# Patient Record
Sex: MALE | Race: WHITE | URBAN - METROPOLITAN AREA
[De-identification: names, ages, dates, MRNs, and addresses within clinical notes are randomized per-mention and may not be internally consistent; named-entity substitution may affect disease eponyms.]

---

## 2019-01-08 ENCOUNTER — OFFICE VISIT (OUTPATIENT)
Dept: DERMATOLOGY | Facility: CLINIC | Age: 19
End: 2019-01-08
Payer: COMMERCIAL

## 2019-01-08 VITALS — HEART RATE: 83 BPM | DIASTOLIC BLOOD PRESSURE: 78 MMHG | SYSTOLIC BLOOD PRESSURE: 119 MMHG

## 2019-01-08 DIAGNOSIS — L65.9 LOSS OF HAIR: Primary | ICD-10-CM

## 2019-01-08 DIAGNOSIS — L21.9 DERMATITIS, SEBORRHEIC: ICD-10-CM

## 2019-01-08 RX ORDER — CLOBETASOL PROPIONATE 0.05 G/100ML
SHAMPOO TOPICAL
Qty: 118 ML | Refills: 1 | Status: SHIPPED | OUTPATIENT
Start: 2019-01-08

## 2019-01-08 RX ORDER — KETOCONAZOLE 20 MG/ML
SHAMPOO TOPICAL
Refills: 0 | COMMUNITY
Start: 2018-10-29

## 2019-01-08 RX ORDER — KETOCONAZOLE 20 MG/ML
SHAMPOO TOPICAL
Qty: 120 ML | Refills: 3 | Status: SHIPPED | OUTPATIENT
Start: 2019-01-08

## 2019-01-08 RX ORDER — FLUOCINOLONE ACETONIDE 0.11 MG/ML
OIL TOPICAL
Qty: 118 ML | Refills: 3 | Status: CANCELLED | OUTPATIENT
Start: 2019-01-08

## 2019-01-08 RX ORDER — FINASTERIDE 1 MG/1
1 TABLET, FILM COATED ORAL DAILY
Refills: 0 | COMMUNITY
Start: 2018-11-29

## 2019-01-08 ASSESSMENT — PAIN SCALES - GENERAL: PAINLEVEL: NO PAIN (0)

## 2019-01-08 NOTE — LETTER
"1/8/2019       RE: Kris Parks  [address]  [facility] [address]     Dear Colleague,    Thank you for referring your patient, Kris Parks, to the [facility] DERMATOLOGY at [facility]. Please see a copy of my visit note below.    [facility] Dermatology Note      Dermatology Problem List:  1. Non-scarring hair loss -  Monitor the trajectory of hair growth     Lab orders sent to outside facility     Continue Rogaine 5% foam daily     Continue finasteride 2.5mg daily     Monitor to figure out what is needed for maintenance when patient returns from [place] in the summer     2. Seborrheic dermatitis - moderate     Start Clobex shampoo 2-3 times weekly    Start 2% ketoconazole shampoo 2-3 times weekly    Encounter Date: Jan 8, 2019    CC:  Chief Complaint   Patient presents with     Hair Loss     Kris is here today for hair loss. Kris notes\" The hair loss is more so thinning rather than patches\"        History of Present Illness:  Mr. Kirs Parks is a 18 year old male who presents, with his father an internist, as a referral from Dr. Brannon Souza of [facility] for hair loss. He feels that his hair started shedding in the summer 2017 a few months after experiencing a stressful event in the winter. He feels that the shedding is generalized and is most noticeable after showering/shampooing. He experiences very mild itching but no burning or pain. He has also noticed a decrease in eyebrow and eyelash density but denies changes in body hair, nails. Per the father, he has not had any thyroid issues. The patient also feels that his hair has improved since the shedding first started.     Currently he shampoos with head and shoulders or 2% ketoconazole every other day, takes finasteride 2 mg daily (will go to up 2.5mg when finished with his current bottle), multivitamin for men, and Rogaine 5% foam daily.     The patient notes that he started " doxycycline for acne in the spring 2017 and stopped 3 months ago.     Past Medical History:   There is no problem list on file for this patient.  acne - one course of Accutane 2013, now on doxycycline as noted above    History reviewed. No pertinent past medical history.  History reviewed. No pertinent surgical history.  [place] teeth removal  Hernia surgery in 5th grade     Social History:  Patient reports that  has never smoked. he has never used smokeless tobacco.   Attends school at the [employer]. Will be studying abroad in [place] this semester.    Family History:  History reviewed. No pertinent family history.  hypothyroidism in father   male pattern hair loss in father    Medications:  Current Outpatient Medications   Medication Sig Dispense Refill     finasteride (PROPECIA) 1 MG tablet Take 1 tablet by mouth daily  0     ketoconazole (NIZORAL) 2 % external shampoo ISMAEL AA D PRN  0     No Known Allergies  None     Review of Systems:  -As per HPI  -Constitutional: Otherwise feeling well today, in usual state of health.  -HEENT: Patient denies nonhealing oral sores.  -Skin: As above in HPI. No additional skin concerns.    Physical exam:  Vitals: /78   Pulse 83   GEN: This is a well developed, well-nourished female in no acute distress, in a pleasant mood.    SKIN: Focused examination of the scalp and face was performed.  - very thick, course and full hair on all parts of the scalp  - part width: frontal 1.1-1.2, vertex 1.2, occipital 1.1   - moderate patchy erythema on most of the scalp with scattered white scale  - eyebrows and lashes are full  - no nail abnormalities    - No other lesions of concern on areas examined.     Impression/Plan:  1. Non-scarring hair loss -  Monitor the trajectory of hair growth     Rule out  Common medical conditions associated with hair loss - thyroid and iron studies    Continue Rogaine 5% foam daily     Continue finasteride 2.5mg daily     Monitor to  figure out what is needed for maintenance when patient returns from [place] in the summer     2. Seborrheic dermatitis - moderate     Start Clobex shampoo 2-3 times weekly    Start 2% ketoconazole shampoo 2-3 times weekly       Follow-up in 5-6 months, earlier for new or changing lesions.     Staff Involved:  Katiana Gomez MD  Dermatology Research Fellow     Patient was seen and examined with the clinical research fellow. I agree with the history, review of systems, physical examination, assessments and plan.    Maria Esther England MD  Professor and  Chair  Department of Dermatology  [facility]             Again, thank you for allowing me to participate in the care of your patient.      Sincerely,    Maria Esther England MD

## 2019-01-08 NOTE — PROGRESS NOTES
"[facility] Dermatology Note      Dermatology Problem List:  1. Non-scarring hair loss -  Monitor the trajectory of hair growth     Lab orders sent to outside facility     Continue Rogaine 5% foam daily     Continue finasteride 2.5mg daily     Monitor to figure out what is needed for maintenance when patient returns from [place] in the summer     2. Seborrheic dermatitis - moderate     Start Clobex shampoo 2-3 times weekly    Start 2% ketoconazole shampoo 2-3 times weekly    Encounter Date: Jan 8, 2019    CC:  Chief Complaint   Patient presents with     Hair Loss     Kris is here today for hair loss. Kris notes\" The hair loss is more so thinning rather than patches\"        History of Present Illness:  Mr. Kris Parks is a 18 year old male who presents, with his father an internist, as a referral from Dr. Brannon Souza of [facility] for hair loss. He feels that his hair started shedding in the summer 2017 a few months after experiencing a stressful event in the winter. He feels that the shedding is generalized and is most noticeable after showering/shampooing. He experiences very mild itching but no burning or pain. He has also noticed a decrease in eyebrow and eyelash density but denies changes in body hair, nails. Per the father, he has not had any thyroid issues. The patient also feels that his hair has improved since the shedding first started.     Currently he shampoos with head and shoulders or 2% ketoconazole every other day, takes finasteride 2 mg daily (will go to up 2.5mg when finished with his current bottle), multivitamin for men, and Rogaine 5% foam daily.     The patient notes that he started doxycycline for acne in the spring 2017 and stopped 3 months ago.     Past Medical History:   There is no problem list on file for this patient.  acne - one course of Accutane 2013, now on doxycycline as noted above    History reviewed. No pertinent past medical history.  History " reviewed. No pertinent surgical history.  [place] teeth removal  Hernia surgery in 5th grade     Social History:  Patient reports that  has never smoked. he has never used smokeless tobacco.   Attends school at the [employer]. Will be studying abroad in [place] this semester.    Family History:  History reviewed. No pertinent family history.  hypothyroidism in father   male pattern hair loss in father    Medications:  Current Outpatient Medications   Medication Sig Dispense Refill     finasteride (PROPECIA) 1 MG tablet Take 1 tablet by mouth daily  0     ketoconazole (NIZORAL) 2 % external shampoo ISMAEL AA D PRN  0     No Known Allergies  None     Review of Systems:  -As per HPI  -Constitutional: Otherwise feeling well today, in usual state of health.  -HEENT: Patient denies nonhealing oral sores.  -Skin: As above in HPI. No additional skin concerns.    Physical exam:  Vitals: /78   Pulse 83   GEN: This is a well developed, well-nourished female in no acute distress, in a pleasant mood.    SKIN: Focused examination of the scalp and face was performed.  - very thick, course and full hair on all parts of the scalp  - part width: frontal 1.1-1.2, vertex 1.2, occipital 1.1   - moderate patchy erythema on most of the scalp with scattered white scale  - eyebrows and lashes are full  - no nail abnormalities    - No other lesions of concern on areas examined.     Impression/Plan:  1. Non-scarring hair loss -  Monitor the trajectory of hair growth     Rule out  Common medical conditions associated with hair loss - thyroid and iron studies    Continue Rogaine 5% foam daily     Continue finasteride 2.5mg daily     Monitor to figure out what is needed for maintenance when patient returns from [place] in the summer     2. Seborrheic dermatitis - moderate     Start Clobex shampoo 2-3 times weekly    Start 2% ketoconazole shampoo 2-3 times weekly       Follow-up in 5-6 months, earlier for new or changing  lesions.     Staff Involved:  Katiana Gomez MD  Dermatology Research Fellow     Patient was seen and examined with the clinical research fellow. I agree with the history, review of systems, physical examination, assessments and plan.    Maria Esther England MD  Professor and  Chair  Department of Dermatology  [facility]

## 2019-01-08 NOTE — NURSING NOTE
"Dermatology Rooming Note    Kris Parks's goals for this visit include:   Chief Complaint   Patient presents with     Hair Loss     Kris is here today for hair loss. Kris notes\" The hair loss is more so thinning rather than patches\"      Linnea Cao, RMA    "

## 2019-01-08 NOTE — LETTER
Date:February 12, 2019      Patient was self referred, no letter generated. Do not send.        [facility] Health Information

## 2019-01-15 ENCOUNTER — TELEPHONE (OUTPATIENT)
Dept: DERMATOLOGY | Facility: CLINIC | Age: 19
End: 2019-01-15

## 2019-01-15 NOTE — TELEPHONE ENCOUNTER
Prior Authorization Retail Medication Request    Medication/Dose: clobetasol propionate (CLOBEX) 0.05 % external shampoo  ICD code (if different than what is on RX):    Previously Tried and Failed:    Rationale:      Insurance Name:    Coverage information:     Subscriber: [id number] CHERI MARCELO     Rel to sub: 01 - Self     Member ID: [id number]     Payor: [employer] Ph: [phone]     Benefit plan: [id number] OPEN ACCESS Ph: [phone]     Group number: [id number]     Member effective dates: from 01/01/19        Insurance ID:        Pharmacy Information (if different than what is on RX)  Name:    Phone:

## 2019-01-15 NOTE — TELEPHONE ENCOUNTER
Patient is hoping for a response this week as he is travelling abroad and will be leaving this Saturday .

## 2019-01-16 ENCOUNTER — TRANSFERRED RECORDS (OUTPATIENT)
Dept: HEALTH INFORMATION MANAGEMENT | Facility: CLINIC | Age: 19
End: 2019-01-16

## 2019-01-17 ENCOUNTER — TELEPHONE (OUTPATIENT)
Dept: DERMATOLOGY | Facility: CLINIC | Age: 19
End: 2019-01-17

## 2019-01-17 DIAGNOSIS — L21.9 DERMATITIS, SEBORRHEIC: Primary | ICD-10-CM

## 2019-01-17 RX ORDER — FLUOCINONIDE TOPICAL SOLUTION USP, 0.05% 0.5 MG/ML
SOLUTION TOPICAL DAILY PRN
Qty: 60 ML | Refills: 3 | Status: SHIPPED | OUTPATIENT
Start: 2019-01-17 | End: 2020-01-17

## 2019-01-17 NOTE — TELEPHONE ENCOUNTER
[facility] Call Center    Phone Message    May a detailed message be left on voicemail: yes    Reason for Call: Other: per pt- clobetasol propionate (CLOBEX) 0.05 % external shampoo needs a prior authorization done- or wondering if Dr can prescribe a cheaper alternative, pt is leaving the country in 2 days so this a very urgent matter, thanks!     Action Taken: Message routed to:  [facility] derm

## 2019-01-18 ENCOUNTER — DOCUMENTATION ONLY (OUTPATIENT)
Dept: DERMATOLOGY | Facility: CLINIC | Age: 19
End: 2019-01-18

## 2019-01-18 NOTE — TELEPHONE ENCOUNTER
Central Prior Authorization Team   Phone: [phone]    PA Initiation    Medication: clobetasol propionate (CLOBEX) 0.05 % external shampoo  Insurance Company: [employer] - Phone [phone] Fax [phone]  Pharmacy Filling the Rx: [id number] [place] - [address] AT [place] [address]  Filling Pharmacy Phone: [phone]  Filling Pharmacy Fax:    Start Date: 1/18/2019

## 2019-01-18 NOTE — TELEPHONE ENCOUNTER
Prior Authorization Approval    Authorization Effective Date: 1/18/2019  Authorization Expiration Date: 1/18/2020  Medication: clobetasol propionate (CLOBEX) 0.05 % external shampoo - approved  Approved Dose/Quantity:   Reference #:     Insurance Company: [employer] - Phone [phone] Fax [phone]  Expected CoPay:       CoPay Card Available:      Foundation Assistance Needed:    Which Pharmacy is filling the prescription (Not needed for infusion/clinic administered): [id number] - [place], [place] - [address] AT [place] [address]  Pharmacy Notified: Yes  Patient Notified: Yes